# Patient Record
Sex: FEMALE | Race: WHITE
[De-identification: names, ages, dates, MRNs, and addresses within clinical notes are randomized per-mention and may not be internally consistent; named-entity substitution may affect disease eponyms.]

---

## 2018-06-12 ENCOUNTER — HOSPITAL ENCOUNTER (INPATIENT)
Dept: HOSPITAL 89 - OB | Age: 28
LOS: 4 days | Discharge: HOME | End: 2018-06-16
Attending: OBSTETRICS & GYNECOLOGY | Admitting: OBSTETRICS & GYNECOLOGY
Payer: COMMERCIAL

## 2018-06-12 VITALS
BODY MASS INDEX: 26.87 KG/M2 | BODY MASS INDEX: 26.87 KG/M2 | HEIGHT: 62 IN | WEIGHT: 146 LBS | HEIGHT: 62 IN | WEIGHT: 146 LBS

## 2018-06-12 DIAGNOSIS — O30.043: Primary | ICD-10-CM

## 2018-06-12 DIAGNOSIS — Z87.891: ICD-10-CM

## 2018-06-12 DIAGNOSIS — Z3A.37: ICD-10-CM

## 2018-06-12 DIAGNOSIS — Z87.42: ICD-10-CM

## 2018-06-12 DIAGNOSIS — O61.0: ICD-10-CM

## 2018-06-12 DIAGNOSIS — E28.2: ICD-10-CM

## 2018-06-12 DIAGNOSIS — O34.83: ICD-10-CM

## 2018-06-12 DIAGNOSIS — D62: ICD-10-CM

## 2018-06-12 DIAGNOSIS — Z87.440: ICD-10-CM

## 2018-06-12 LAB — PLATELET COUNT, AUTOMATED: 191 K/UL (ref 150–450)

## 2018-06-12 PROCEDURE — 36415 COLL VENOUS BLD VENIPUNCTURE: CPT

## 2018-06-12 PROCEDURE — 85025 COMPLETE CBC W/AUTO DIFF WBC: CPT

## 2018-06-12 PROCEDURE — 86901 BLOOD TYPING SEROLOGIC RH(D): CPT

## 2018-06-12 PROCEDURE — 86850 RBC ANTIBODY SCREEN: CPT

## 2018-06-12 PROCEDURE — 86900 BLOOD TYPING SEROLOGIC ABO: CPT

## 2018-06-12 PROCEDURE — 88307 TISSUE EXAM BY PATHOLOGIST: CPT

## 2018-06-12 PROCEDURE — 86920 COMPATIBILITY TEST SPIN: CPT

## 2018-06-13 VITALS — DIASTOLIC BLOOD PRESSURE: 78 MMHG | SYSTOLIC BLOOD PRESSURE: 120 MMHG

## 2018-06-13 VITALS — DIASTOLIC BLOOD PRESSURE: 65 MMHG | SYSTOLIC BLOOD PRESSURE: 116 MMHG

## 2018-06-13 VITALS — DIASTOLIC BLOOD PRESSURE: 82 MMHG | SYSTOLIC BLOOD PRESSURE: 134 MMHG

## 2018-06-13 VITALS — SYSTOLIC BLOOD PRESSURE: 122 MMHG | DIASTOLIC BLOOD PRESSURE: 67 MMHG

## 2018-06-13 VITALS — SYSTOLIC BLOOD PRESSURE: 121 MMHG | DIASTOLIC BLOOD PRESSURE: 81 MMHG

## 2018-06-13 VITALS — SYSTOLIC BLOOD PRESSURE: 114 MMHG | DIASTOLIC BLOOD PRESSURE: 72 MMHG

## 2018-06-13 VITALS — SYSTOLIC BLOOD PRESSURE: 125 MMHG | DIASTOLIC BLOOD PRESSURE: 85 MMHG

## 2018-06-13 VITALS — SYSTOLIC BLOOD PRESSURE: 127 MMHG | DIASTOLIC BLOOD PRESSURE: 79 MMHG

## 2018-06-13 VITALS — DIASTOLIC BLOOD PRESSURE: 77 MMHG | SYSTOLIC BLOOD PRESSURE: 112 MMHG

## 2018-06-13 VITALS — DIASTOLIC BLOOD PRESSURE: 62 MMHG | SYSTOLIC BLOOD PRESSURE: 112 MMHG

## 2018-06-13 VITALS — SYSTOLIC BLOOD PRESSURE: 112 MMHG | DIASTOLIC BLOOD PRESSURE: 84 MMHG

## 2018-06-13 VITALS — SYSTOLIC BLOOD PRESSURE: 125 MMHG | DIASTOLIC BLOOD PRESSURE: 75 MMHG

## 2018-06-13 VITALS — DIASTOLIC BLOOD PRESSURE: 71 MMHG | SYSTOLIC BLOOD PRESSURE: 129 MMHG

## 2018-06-13 VITALS — DIASTOLIC BLOOD PRESSURE: 82 MMHG | SYSTOLIC BLOOD PRESSURE: 128 MMHG

## 2018-06-13 LAB — PLATELET COUNT, AUTOMATED: 143 K/UL (ref 150–450)

## 2018-06-13 PROCEDURE — 3E033VJ INTRODUCTION OF OTHER HORMONE INTO PERIPHERAL VEIN, PERCUTANEOUS APPROACH: ICD-10-PCS | Performed by: OBSTETRICS & GYNECOLOGY

## 2018-06-13 PROCEDURE — 10907ZC DRAINAGE OF AMNIOTIC FLUID, THERAPEUTIC FROM PRODUCTS OF CONCEPTION, VIA NATURAL OR ARTIFICIAL OPENING: ICD-10-PCS | Performed by: OBSTETRICS & GYNECOLOGY

## 2018-06-13 RX ADMIN — FAMOTIDINE SCH MG: 20 TABLET, FILM COATED ORAL at 23:00

## 2018-06-13 RX ADMIN — METHYLERGONOVINE MALEATE SCH MG: 0.2 TABLET ORAL at 23:00

## 2018-06-13 RX ADMIN — SODIUM CHLORIDE, SODIUM LACTATE, POTASSIUM CHLORIDE, AND CALCIUM CHLORIDE SCH MLS/HR: 600; 310; 30; 20 INJECTION, SOLUTION INTRAVENOUS at 14:48

## 2018-06-13 RX ADMIN — ROPIVACAINE HYDROCHLORIDE PRN MCG: 2 INJECTION, SOLUTION EPIDURAL; INFILTRATION at 11:04

## 2018-06-13 RX ADMIN — ROPIVACAINE HYDROCHLORIDE PRN MCG: 2 INJECTION, SOLUTION EPIDURAL; INFILTRATION at 09:25

## 2018-06-13 RX ADMIN — SODIUM CHLORIDE, SODIUM LACTATE, POTASSIUM CHLORIDE, AND CALCIUM CHLORIDE SCH MLS/HR: 600; 310; 30; 20 INJECTION, SOLUTION INTRAVENOUS at 00:13

## 2018-06-13 RX ADMIN — DOCUSATE CALCIUM SCH MG: 240 CAPSULE, LIQUID FILLED ORAL at 21:00

## 2018-06-13 RX ADMIN — ROPIVACAINE HYDROCHLORIDE PRN MCG: 2 INJECTION, SOLUTION EPIDURAL; INFILTRATION at 07:34

## 2018-06-13 RX ADMIN — SODIUM CHLORIDE, SODIUM LACTATE, POTASSIUM CHLORIDE, AND CALCIUM CHLORIDE SCH MLS/HR: 600; 310; 30; 20 INJECTION, SOLUTION INTRAVENOUS at 12:02

## 2018-06-13 NOTE — HISTORY & PHYSICAL
History of Present Illness


Age of Patient:  27


:  1


Para or TPAL:  0


EDC per LMP:  2018


Estimated Gestational Age:  37.3


Chief Complaint


Induction of labor


History of Present Illness


26yo  at 37.3 weeks with Di/Di twin pregnancy from IVF day 5 embryo 

transfer on 10/13/18.  Pregnancy has been uncomplicated throughout.  Growth and 

been concordant and no  labor signs or symptoms.  Both babies are female 

and vtx/vtx.  She has been offered  as an option but desires vaginal 

delivery if possible.  Planning on a double set up delivery.  Medical history 

is benign.  Tori has PCOS which complicated naturally conceiving even 

following ovulation induction which precipitated the IVF.  Otherwise she is 

healthy.  Past Medical, Surgical, Family and Obstetric Histories reviewed. 

Please see [facility] prenatal chart.





History


Patient's Blood Type:  A Positive


Rubella Status:  Immune


Group B Strep Screen:  Negative


Past Medical History:


 - T&A


 - wisdom teeth


 - Ruptured achilles tendon


PCOS, abnormal pap


Allergies:  


Coded Allergies:  


     No Known Drug Allergies (Unverified , 18)





Med Rec


Home Meds


Reported Medications


Prenatal Vit37/Iron/Folic Acid (PRENATA CHEWABLE TABLET) 1 Each Tab.chew, 1 

EACH PO, TAB.CHEW


   18





Review of Systems


All Systems Reviewed/Normal:  Yes, Except as Noted





Exam


General Exam


Vital Signs





Vital Signs








  Date Time  Temp Pulse Resp B/P (MAP) Pulse Ox O2 Delivery O2 Flow Rate FiO2


 


18 02:44 99.2 63 16 122/67 (85) 94 Room Air  








General Apperance:  Alert/Awake/No Acute Distress


Neuro:  No Gross deficits


Respiratory:  No Respiratory Distress


Abdomen:  Soft, Non-Tender, Non-Distended, Gravid - Non-Tender


Integumentary:  Skin Intact without Lesions or Rash


Psychological:  Alert & Oriented X3, Appropriate Mood & Affect





Pregnancy


Vaginal Discharge/Fluid?:  Clear Fluid (AROM)


Cervical Dialation:  4


Cervical Effacement (%):  100


Cervical Consistency:  Soft


Cervical Position:  Anterior


Fetal Presentation:  Vertex (presenting twin)





Fetus


Fetal Heart Tone Variabilty:  Moderate


FHT Accelerations:  15X15


FHT Category:  I





Medical Decision Making


Data Points


Result Diagram:  


18








Imaging


Ultrasound/Imaging


Bedside u/s:  vtx/vtx





VTE Prophylasis: Adult


Deep Vein Thrombosis/Pulmonary:  No


Pharmacological Contraindicati:  Pt at Low Risk for VTE


Mechanical Contraindications:  Pt at Low Risk for VTE





Assessment and Plan


Problems:  


(1) Dichorionic diamniotic twin pregnancy in third trimester


Assessment & Plan:  Planning double set up delivery.  U/S at bedside was vtx/

vtx.  Cervix was 3cm on admission and we had planned Cervidil but changed to 

low dose Pitocin through the night.  AROM just now done with clear fluid.  FHTs 

category 1.  Currently undecided about epidural but leaning toward having one.  

Expecting  but keeping a close watch.














RUTH DAS MD 2018 09:56

## 2018-06-13 NOTE — PROCEDURE NOTE
Anesthetic Placement Note


Anesthesia Plan:  LEB


Permit for Anesthesia Signed:  Yes


Anesthesia Technique:  Patient Sitting


Anesthesia Prep:  Chlorhexidine


Interspace:  L 4-5


Local Anesthetic:  1% Lidocaine


Amount Local - cc's:  3


Anesthesia Needle:  17g Toshahram/Dadaff


Anesthesia Attempts:  1 (no needle redirections)


Loss of Resistance:  Normal Saline


Depth of OWEN (cm):  4


Epidural Needle Placement:  No CSF, No Blood, No Parasthesia


Cerebral Spinal Fluid:  No


Catheter Insertion (cm):  5 (9 cm at skin)


Catheter Type:  Shine - Spring Wound


Epidural Dressing:  Tegaderm, Tape, Adhesive Spray


Anesthesia Tray:  Lot Number [id number]), Expiration Date (07/31/2019), 

Reference Number ([id number])





Anesthesia Medications:


Epidural Test Dose:  1.5 Lido/Epi (1:200,000), Dose - mL (5mL in incremental 

doses), Time (1256), Negative


Epidural Loading Dose:  0.2% Ropivicaine, With Fentanyl 2mcg/ml, Dose - ml (5), 

Time (1307)


Epidural Infusion:  0.2% Ropivicaine, With Fentanyl 2mcg/ml, Start Time: (1307)


Epidural Pump Setting:  Bolus Dose - mL (4), Lockout - Minutes (15), 

Maintenance Rate - mL/hr (5), Maximum per Hour - mL (17)


Complications:  None


Comment:


100 mcg Fentanyl bolus via epidural catheter at 1300











KRYSTAL CHAPARRO CRNA Jun 13, 2018 13:25

## 2018-06-13 NOTE — LABOR PROGRESS NOTE
Labor Subjective


Progress Notes


Subjective


Doing well.  Labor pain recently demanded epidural placement and now is 

comfortable.  A few variables noted on baby B.  Clear fluid still and ample 

amounts.  Was 5 cm on last exam at 1130.


Feeling Fetal Movement?:  Yes


Vaginal Discharge/Fluid:  Clear Fluid


Labor Pain:  Mild





Labor Objective


Pregnancy


Vital Signs





Vital Signs








  Date Time  Temp Pulse Resp B/P (MAP) Pulse Ox O2 Delivery O2 Flow Rate FiO2


 


6/13/18 02:44 99.2 63 16 122/67 (85) 94 Room Air  








Vaginal Discharge/Fluid?:  Clear Fluid


Cervical Dialation:  5


Cervical Effacement (%):  100


Cervical Consistency:  Soft


Cervical Position:  Anterior


Fetal Station:  -2


Fetal Presentation:  Vertex





Fetus


Fetal Heart Tone Variabilty:  Moderate


FHT Accelerations:  15X15


FHT Decelerations:  Variable (occasional on baby B)


FHT Category:  I





General Exam


Abdomen:  Soft, Non-Tender, Non-Distended, Gravid - Non-Tender


Psychological:  Alert & Oriented X3, Appropriate Mood & Affect





Other


Result Diagram:  


6/12/18 1953








Assessment and Plan


Problems:  


(1) Dichorionic diamniotic twin pregnancy in third trimester


Assessment & Plan:  Pitocin currently at 16 mu/min.  Will advance to sustain 

contraction strength post epidural.  Await progress at next exam by 1600.  If 

not, considering IUPC.














RUTH DAS MD Jun 13, 2018 14:30

## 2018-06-13 NOTE — ANESTHESIA PROGRESS NOTE
Progress/Maintenance


Anesthesia Note Date:  Jun 13, 2018


Anesthesia Note Time:  18:00


Pain Intensity:  4


Pump:  Off


Motor Level:  Bending Knees-Bilateral


Position:  Right, Tilt





Assessment and Plan


Anesthesia Plan:  LEB


Assessment


C/S called by Dr. Ramirez.  Plan to use epidural with GA as backup.  Epidural 

infusion off and 2% Lidocaine + 1:200k epi bolused in 5 mL increments for a 

total of 15 mL.  T6 level bilaterally before incision.


Anesthesia Stop Day:  Jun 13, 2018


Anesthesia Stop Time:  18:00


Epidural Catheter Removal:  Removed Catheter Intact, Yes, Removed by: (Krystal Clayton CRNA)


Removal Date:  Jun 13, 2018


Removal Time:  19:28











KRYSTAL CLAYTON CRNA Jun 13, 2018 20:05

## 2018-06-13 NOTE — POST OPERATIVE NOTE
Operative Note - OB/GYN


Operative Day


Date:  Jun 13, 2018


Time:  19:31





Physicians


Surgeon:  


Ashley


Assistant:  


Kristian


Anesthesia:  


Epidural





Diagnosis


Pre-Op Diagnosis:  


Twin gestation Di/Di


Failure to progress in labor


Post-Op Diagnosis:  


Same


Uterine atony





Procedure


Findings:  


vtx/vtx, compacted, uterin atony, normal uterus, ovaries and tubes


Procedure(s):  


Primary LTCS


Specimen Removed:(Maybe N/A):  


placenta


Complications:  


hemorrhage


#[id number]





Fluids


Fluids:  


2000 ml


Estimated Blood Loss:  


2000 ml





Dictated


Date OP Note Dictated:  Jun 13, 2018


Time OP Note Dictated:  19:32


Copies to:   RUTH DAS MD, TRAVIS MD Jun 13, 2018 19:40

## 2018-06-13 NOTE — LABOR PROGRESS NOTE
Labor Subjective


Progress Notes


Subjective


Cervix not much different or descent of presenting twin.  Contractions at good 

frequency and palpating moderate to strong.  Pitocin at 20mu/min.  Discussed 

options including IUPC placement and adjusting Pitocin and more time.  Pt is 

now frustrated and tired.   delivery also offered and she elects to 

proceed with this.  Risks/benefits discussed and expected recovery.  Questions 

answered.





Labor Objective


Pregnancy


Vital Signs





Vital Signs








  Date Time  Temp Pulse Resp B/P (MAP) Pulse Ox O2 Delivery O2 Flow Rate FiO2


 


18 02:44 99.2 63 16 122/67 (85) 94 Room Air  








Cervical Dialation:  5.5


Cervical Effacement (%):  100


Cervical Consistency:  Soft


Cervical Position:  Anterior


Fetal Station:  -2


Fetal Presentation:  Vertex





Fetus


Fetal Heart Tone Variabilty:  Moderate


FHT Accelerations:  15X15


FHT Category:  I





Other


Result Diagram:  


18








Assessment and Plan


Problems:  


(1) Dichorionic diamniotic twin pregnancy in third trimester


Assessment & Plan:  proceed with abdominal delivery as discussed and consented 

for.














RUTH DAS MD 2018 18:03

## 2018-06-13 NOTE — ANESTHESIA OB PRE-ANES EVAL
History of Present Illness


Anesthesia Start Date:  2018


Anesthesia Start Time:  12:37


OB Anesthesia Diagnosis:  induction - medical (Twins 37 4/7 weeks)


EDC:  2018


:  1


Para:  0


Vital Signs:





Vital Signs








  Date Time  Temp Pulse Resp B/P (MAP) Pulse Ox O2 Delivery O2 Flow Rate FiO2


 


18 02:44 99.2 63 16 122/67 (85) 94 Room Air  








Pain Ratin


Result Diagram:  


18





Height (Inches):  62.00


Weight (Pounds):  146


BMI Calculated:  26.70





Past Medical History


Medical History:  no pertinent history (former smoker)


Surgical History:  tonsillectomy, other (ACL repair)


Previous Anesthesia:  general


Attended Childbirth Classes?:  No


Hx Anesthesia Reactions:  No


Hx Family Anesthesia Reaction:  No


Current Medications:  pitocin, pain medication


Home Meds


Reported Medications


Prenatal Vit37/Iron/Folic Acid (PRENATA CHEWABLE TABLET) 1 Each Tab.chew, 1 

EACH PO, TAB.CHEW


   18


Allergies:  


Coded Allergies:  


     No Known Drug Allergies (Unverified , 18)





Anesthesia OB ROS


Neurological:  No migraines/headaches, No seizures, No neuropathy, No other


ENT:  Denies Tooth caps, Denies Loose teeth, Denies Chipped teeth, Denies 

Dentures, Denies Bridges, Denies Retainers, Denies Veneers, Denies Implants, 

Denies Tongue ring, Denies Other


Airway Class:  ll


Cardiovascular ROS:  No edema, No arrhythmia, No other


GI ROS:  clear liquids


 ROS:  No Herpes, No STD(s), No Liver Disease, No Renal Disease, No Other


Endocrine ROS:  No diabetes, No gestational diabetes, No thyroid disorder, No 

other


Musculoskeletal ROS:  No low back pain, No low back injury, No scoliosis, No 

other


ASA Classification:  1





Assessment and Plan


Anesthesia Plan:  LEB (Consented for GA as backup in event of emergent c/s and 

insufficient time to dose epidural.  )











KRYSTAL CHAPARRO CRNA 2018 13:21

## 2018-06-14 VITALS — DIASTOLIC BLOOD PRESSURE: 74 MMHG | SYSTOLIC BLOOD PRESSURE: 99 MMHG

## 2018-06-14 VITALS — SYSTOLIC BLOOD PRESSURE: 115 MMHG | DIASTOLIC BLOOD PRESSURE: 79 MMHG

## 2018-06-14 VITALS — DIASTOLIC BLOOD PRESSURE: 67 MMHG | SYSTOLIC BLOOD PRESSURE: 103 MMHG

## 2018-06-14 VITALS — DIASTOLIC BLOOD PRESSURE: 70 MMHG | SYSTOLIC BLOOD PRESSURE: 104 MMHG

## 2018-06-14 VITALS — DIASTOLIC BLOOD PRESSURE: 59 MMHG | SYSTOLIC BLOOD PRESSURE: 103 MMHG

## 2018-06-14 VITALS — DIASTOLIC BLOOD PRESSURE: 63 MMHG | SYSTOLIC BLOOD PRESSURE: 113 MMHG

## 2018-06-14 VITALS — SYSTOLIC BLOOD PRESSURE: 97 MMHG | DIASTOLIC BLOOD PRESSURE: 72 MMHG

## 2018-06-14 VITALS — SYSTOLIC BLOOD PRESSURE: 100 MMHG | DIASTOLIC BLOOD PRESSURE: 61 MMHG

## 2018-06-14 VITALS — DIASTOLIC BLOOD PRESSURE: 71 MMHG | SYSTOLIC BLOOD PRESSURE: 100 MMHG

## 2018-06-14 VITALS — DIASTOLIC BLOOD PRESSURE: 63 MMHG | SYSTOLIC BLOOD PRESSURE: 107 MMHG

## 2018-06-14 VITALS — SYSTOLIC BLOOD PRESSURE: 99 MMHG | DIASTOLIC BLOOD PRESSURE: 61 MMHG

## 2018-06-14 VITALS — DIASTOLIC BLOOD PRESSURE: 67 MMHG | SYSTOLIC BLOOD PRESSURE: 100 MMHG

## 2018-06-14 LAB
PLATELET COUNT, AUTOMATED: 127 K/UL (ref 150–450)
PLATELET COUNT, AUTOMATED: 128 K/UL (ref 150–450)

## 2018-06-14 PROCEDURE — 30233N1 TRANSFUSION OF NONAUTOLOGOUS RED BLOOD CELLS INTO PERIPHERAL VEIN, PERCUTANEOUS APPROACH: ICD-10-PCS | Performed by: OBSTETRICS & GYNECOLOGY

## 2018-06-14 RX ADMIN — SIMETHICONE CHEW TAB 80 MG PRN MG: 80 TABLET ORAL at 21:18

## 2018-06-14 RX ADMIN — IBUPROFEN SCH MG: 800 TABLET ORAL at 20:19

## 2018-06-14 RX ADMIN — METHYLERGONOVINE MALEATE SCH MG: 0.2 TABLET ORAL at 16:22

## 2018-06-14 RX ADMIN — DOCUSATE CALCIUM SCH MG: 240 CAPSULE, LIQUID FILLED ORAL at 20:18

## 2018-06-14 RX ADMIN — KETOROLAC TROMETHAMINE SCH MG: 30 INJECTION, SOLUTION INTRAMUSCULAR; INTRAVENOUS at 07:17

## 2018-06-14 RX ADMIN — FAMOTIDINE SCH MG: 20 TABLET, FILM COATED ORAL at 20:19

## 2018-06-14 RX ADMIN — SIMETHICONE CHEW TAB 80 MG PRN MG: 80 TABLET ORAL at 09:05

## 2018-06-14 RX ADMIN — METHYLERGONOVINE MALEATE SCH MG: 0.2 TABLET ORAL at 11:14

## 2018-06-14 RX ADMIN — FAMOTIDINE SCH MG: 20 TABLET, FILM COATED ORAL at 09:04

## 2018-06-14 RX ADMIN — KETOROLAC TROMETHAMINE SCH MG: 30 INJECTION, SOLUTION INTRAMUSCULAR; INTRAVENOUS at 01:17

## 2018-06-14 RX ADMIN — DOCUSATE CALCIUM SCH MG: 240 CAPSULE, LIQUID FILLED ORAL at 09:04

## 2018-06-14 RX ADMIN — METHYLERGONOVINE MALEATE SCH MG: 0.2 TABLET ORAL at 04:49

## 2018-06-14 NOTE — ANESTHESIA POST EVAL NOTE
Anesthesia Post Eval Note





Vital Signs








  Date Time  Temp Pulse Resp B/P (MAP) Pulse Ox O2 Delivery O2 Flow Rate FiO2


 


6/14/18 09:46  102      


 


6/14/18 09:46   18 103/59    


 


6/14/18 09:46 98.4       


 


6/14/18 09:37     93 Room Air  


 


6/14/18 06:00       1.0 








Pt able to participate in Eval:  Yes


Cardiovascular Status:  Satisfactory


Respiratory Status:  Satisfactory


Pain Managment:  Satisfactory


PO Nausea/Vomiting:  Satisfactory


Temperature Management:  Satisfactory


Mental Status:  Satisfactory, Alert, Oriented X3


Post-Op Hydration Status:  Satisfactory, Tolerating PO Well


Anesthesia Type:  LEB


Anesthesia Tolerance:


Doing well this morning.  H&H trended down throughout the night and is now 

receiving 2 units PRBCs per surgeon.  No c/o nausea now.  Pain well controlled.

  Only complaint is pruritus which has been unresponsive to nubain and 

diphenhydramine.  No questions or concerns from patient.  States she is happy 

with care received.











KRYSTAL CHAPARRO CRNA Jun 14, 2018 10:34

## 2018-06-14 NOTE — OPERATIVE REPORT 1
EVENT DATE: 2018

SURGEON: Twin Ramirez MD

ANESTHESIA: Epidural, Cheko Clayton CRNA

ASSISTANT: Dedrick Townsend MD



PREOPERATIVE DIAGNOSES

1.  Twin gestation, diamniotic, dichorionic.

2.  Vertex, vertex presentation.

3.  Failure to progress in labor/failed induction.  



POSTOPERATIVE DIAGNOSES

1.  Twin gestation, diamniotic, dichorionic.

2.  Vertex, vertex presentation.

3.  Failure to progress in labor/failed induction.  

4.  Uterine atony with postpartum hemorrhage.



PROCEDURE PERFORMED 

Primary low transverse  section via Pfannenstiel skin incision.  



ESTIMATED BLOOD LOSS 

2000 mL.



FLUIDS

2000 mL IV crystalloid.



MEDICATIONS ADMINISTERED

40 units of Pitocin infused IV, 0.2 mg Methergine administered IM and 250 mcg 
carboprost administered IM during the case.  800 mcg Cytotec was placed 
rectally upon completion of the case.  



PROCEDURE IN DETAIL

The patient was brought to the operating room after informed consent was 
obtained with an epidural in place and placed in the dorsal supine position 
with a leftward tilt.  Epidural was brought to surgical levels, and she was 
prepped and draped in the usual sterile fashion.  Using a knife, a Pfannenstiel 
skin incision was made, carried through to the underlying rectus fascia.  This 
was nicked in the midline, extended laterally.  The rectus fascia was dissected 
off underlying rectus muscles using sharp dissection, and then  in the 
midline.  Peritoneum was entered sharply and extended superiorly and 
inferiorly.  Bladder blade was inserted.  The vesicouterine peritoneum was 
elevated and entered sharply and extended laterally.  A bladder flap was 
created digitally.  Using a scalpel, low transverse incision on the uterus was 
made and carried through to the intra-amniotic space.  This was put on lateral 
stretch.  A hand was inserted.  Baby B's head was immediately encountered, and 
I tracked the hand along the uterus lower segment to the cervix, and Baby A's 
head was encountered.  Both were compacted into the pelvis tightly.  Baby A's 
head was elevated to the incision.  Fundal pressure was applied.  The infant's 
head delivered atraumatically.  Mouth and nose were bulb suctioned.  Followed 
by further fundal pressure and manipulation resulted in the delivery of the 
remainder of the infant.  Mouth and nose were again bulb suctioned.  Baby B was 
brought quickly to the presenting location, and fundal pressure was applied.  
The infant's head delivered, followed by manipulation, resulted in delivery of 
the remainder of that infant. Mouth and nose were bulb suctioned.  The cords 
were clamped, cut, and the infants were passed to the waiting resuscitation 
team.  Cord sample was obtained.  Placentas were removed manually and were two 
separate placentas.  Uterus was exteriorized and cleared of clots and debris.  
Thrasher clamps were placed for hemostasis.  Immediately noted was 
significant uterine atony with a very floppy uterus.  We had to roll the uterus 
in order to compress it, all the while Pitocin began infusing initially at 20 
units in the bag, and 0.2 mg of Methergine was called for.  The first layer of 
the uterus was repaired using a #1 Monocryl in a running locking stitch.  Upon 
completion here, another assessment of the uterus resulted in minor improvement 
in uterine tone.  Therefore another 20 units of Pitocin was placed in the bag 
followed by request for 250 mcg of Carboprost.  The second layer imbrication of 
the uterine incision was performed.  Upon completion, there was a moderate 
amount of tone resultant back in the uterus.  Further uterine massage and 
expression of clots were performed in order to assist with the uterine atony.  
The 800 mcg of Cytotec was called for to be ready for placement upon completion 
of the case.   The ultimate uterine tone had improved enough to return the 
uterus to the abdomen after the posterior cul-de-sac was irrigated and swept 
clear of clots and debris.  Bilateral pelvic gutters were swept clear of clots 
and debris  The incision was again inspected in situ, found to be hemostatic.  
Therefore the parietal peritoneum was repaired using a 3-0 Vicryl in a running 
non-locking stitch.  Rectus muscles were reapproximated in the midline with the 
same stitch.  A few capillary bleeders were cauterized on the muscle bellies.  
The rectus fascia was repaired using an #0-Vicryl in a running non-locking 
stitch.  Subcuticular dead space was irrigated and swept clear of clots and 
debris.  Capillary bleeders were cauterized.  The dead  space was closed with a 
3-0 Vicryl in a running non-locking stitch, and skin was repaired using a 4-0 
Monocryl simple subdermal.  The skin incision was covered with Dermabond skin 
adhesive.  Sponge, lap, needle and instrument counts were all correct x three.  
Upon completion, the uterus was manually massaged.  Expression of more clot was 
found at this time, but then once the clot had been expressed, a scant amount 
of bleeding was noted.  Altogether, approximately 2000 mL of blood loss was 
estimated.  The Cytotec 800 mcg was administered rectally.  She was cleaned up 
and returned to Recovery in stable condition.  

[facility]

## 2018-06-14 NOTE — OB/GYN PROGRESS NOTE
OB Subjective


Progress Notes


Subjective


Feeling "foggy" today.  Oxygen sats have been in 80s while asleep.  Little 

incision pain but pain in upper chest/shoulders.  UO improving today.


GI:  NEG Nausea


Pain:  Mild





OB Objective


Physical Exam





Vital Signs








  Date Time  Temp Pulse Resp B/P (MAP) Pulse Ox O2 Delivery O2 Flow Rate FiO2


 


6/14/18 06:00   16  88  1.0 


 


6/14/18 03:00  103  113/63 (80)    


 


6/13/18 20:47 99.7     Room Air  








General Appearance:  Alert/Awake/No Acute Distress


Neurological:  No Gross deficits


Cardiovascular:  Normal Rhythm & Peripheral Pulses, Regular Rate and Rhythm


Respiratory:  No Respiratory Distress, Clear to Auscultation


Abdomen:  Soft, Non-Tender, Non-Distended, Fundus Firm, Tender


Incision:  Clean, Dry, Intact, Dermabond


Extremities:  Warm, Edema (2+)


Integumentary:  Skin Intact without Lesions or Rash


Psychological:  Alert & Oriented X3, Appropriate Mood & Affect


Result Diagram:  


6/14/18 0632








Assessment and Plan


OB/GYN Plan:  Routine Post-Op Care


Problems:  


(1) Dichorionic diamniotic twin pregnancy in third trimester


(2) Anemia due to acute blood loss


Assessment & Plan:  Discussed and offered transfusion.  Risks/benefits reviewed 

and she desires transfusion.  Will give 2 units.














RUTH DAS MD Jun 14, 2018 08:44

## 2018-06-15 VITALS — SYSTOLIC BLOOD PRESSURE: 120 MMHG | DIASTOLIC BLOOD PRESSURE: 96 MMHG

## 2018-06-15 VITALS — SYSTOLIC BLOOD PRESSURE: 103 MMHG | DIASTOLIC BLOOD PRESSURE: 65 MMHG

## 2018-06-15 VITALS — DIASTOLIC BLOOD PRESSURE: 81 MMHG | SYSTOLIC BLOOD PRESSURE: 127 MMHG

## 2018-06-15 RX ADMIN — MAGNESIUM HYDROXIDE PRN ML: 400 SUSPENSION ORAL at 02:41

## 2018-06-15 RX ADMIN — IBUPROFEN SCH MG: 800 TABLET ORAL at 21:01

## 2018-06-15 RX ADMIN — BISACODYL PRN MG: 10 SUPPOSITORY RECTAL at 21:01

## 2018-06-15 RX ADMIN — SIMETHICONE CHEW TAB 80 MG PRN MG: 80 TABLET ORAL at 08:25

## 2018-06-15 RX ADMIN — DOCUSATE CALCIUM SCH MG: 240 CAPSULE, LIQUID FILLED ORAL at 21:01

## 2018-06-15 RX ADMIN — IBUPROFEN SCH MG: 800 TABLET ORAL at 05:31

## 2018-06-15 RX ADMIN — FAMOTIDINE SCH MG: 20 TABLET, FILM COATED ORAL at 08:24

## 2018-06-15 RX ADMIN — MAGNESIUM HYDROXIDE PRN ML: 400 SUSPENSION ORAL at 11:12

## 2018-06-15 RX ADMIN — IBUPROFEN SCH MG: 800 TABLET ORAL at 04:00

## 2018-06-15 RX ADMIN — SIMETHICONE CHEW TAB 80 MG PRN MG: 80 TABLET ORAL at 21:01

## 2018-06-15 RX ADMIN — DOCUSATE CALCIUM SCH MG: 240 CAPSULE, LIQUID FILLED ORAL at 08:24

## 2018-06-15 RX ADMIN — BISACODYL PRN MG: 10 SUPPOSITORY RECTAL at 08:25

## 2018-06-15 RX ADMIN — SIMETHICONE CHEW TAB 80 MG PRN MG: 80 TABLET ORAL at 02:48

## 2018-06-15 RX ADMIN — FAMOTIDINE SCH MG: 20 TABLET, FILM COATED ORAL at 21:01

## 2018-06-15 NOTE — OB/GYN PROGRESS NOTE
OB Subjective


Progress Notes


Subjective


Pain controlled, Tolerating diet and activity.  Baby breastfeeding.  Normal 

lochia.


GI:  POS Flatus, NEG Nausea, NEG Vomiting


:  Voiding Well


Pain:  Mild





OB Objective


Physical Exam





Vital Signs








  Date Time  Temp Pulse Resp B/P (MAP) Pulse Ox O2 Delivery O2 Flow Rate FiO2


 


6/15/18 04:30 98.1  16 103/65 (78)  Room Air  


 


18 21:00       1.0 


 


18 17:15  86   90   








General Appearance:  Alert/Awake/No Acute Distress


Neurological:  No Gross deficits


Cardiovascular:  Normal Rhythm & Peripheral Pulses, Regular Rate and Rhythm


Respiratory:  No Respiratory Distress, Clear to Auscultation


Abdomen:  Soft, Non-Tender, Non-Distended, Fundus Firm, Tender


Incision:  Clean, Dry, Intact, Dermabond


Extremities:  Warm, Edema (2+)


Integumentary:  Skin Intact without Lesions or Rash


Psychological:  Alert & Oriented X3, Appropriate Mood & Affect


Result Diagram:  


18 1813








Assessment and Plan


Problems:  


(1) Dichorionic diamniotic twin pregnancy in third trimester


(2) Anemia due to acute blood loss


Assessment & Plan:  h/h improved tolerating well





(3) Postpartum care following  delivery


Assessment & Plan:  Pain controlled, Tolerating diet and activity.  Baby 

breastfeeding.  Normal lochia.














TORSTEN ALEXIS MD Holden 15, 2018 07:18

## 2018-06-15 NOTE — OB/GYN DISCHARGE SUMMARY
Discharge Summary


Reason for Hosp/Final Diag:  


(1) Dichorionic diamniotic twin pregnancy in third trimester


(2) Anemia due to acute blood loss


(3) Postpartum care following  delivery


Hospital Course & Plan:  TWIN C SECTION, UTERINE ATONY, TRANSFUSION, ON DAY 2, 

Pain controlled, Tolerating diet and activity.  Baby breastfeeding.  Normal 

lochia.





Lates Vital Signs





Vital Signs








  Date Time  Temp Pulse Resp B/P (MAP) Pulse Ox O2 Delivery O2 Flow Rate FiO2


 


6/15/18 04:30 98.1  16 103/65 (78)  Room Air  


 


18 21:00       1.0 


 


18 17:15  86   90   








Weight (Pounds):  146


Result Diagram:  


18 1813





Condition:  Improved


Discharge:  Home, Self Care


Home Meds


Active Scripts


Oxycodone Hcl/Acetaminophen (OXYCODONE-ACETAMINOPHEN 5-325) 1 Each Tablet, 1-2 

EACH PO Q4H Y for PAIN, #30 TAB 0 Refills


   TAKE 1-2 TABLET AS NEEDED FOR PAIN - NO CLOSER THAN EVERY 4 HOURS.


   Prov:OTRSTEN ALEXIS MD         6/15/18


Reported Medications


Prenatal Vit37/Iron/Folic Acid (PRENATA CHEWABLE TABLET) 1 Each Tab.chew, 1 

EACH PO, TAB.CHEW


   18


Follow up with:  Dr. Ramirez [phone]


Follow up in:  6 wks PP or PO, 2 wks PO


Discharge Diet:  As Tolerates


Discharge Activity:  Pelvic Rest


Copies to:   TORSTEN ALEXIS MD, JOHN MD Holden 15, 2018 07:27

## 2018-06-16 VITALS — SYSTOLIC BLOOD PRESSURE: 114 MMHG | DIASTOLIC BLOOD PRESSURE: 78 MMHG

## 2018-06-16 VITALS — DIASTOLIC BLOOD PRESSURE: 85 MMHG | SYSTOLIC BLOOD PRESSURE: 117 MMHG

## 2018-06-16 RX ADMIN — IBUPROFEN SCH MG: 800 TABLET ORAL at 04:28

## 2018-06-16 NOTE — OB/GYN PROGRESS NOTE
OB Subjective


Progress Notes


Subjective


Pain controlled, Tolerating diet and activity.  Baby breastfeeding.  Normal 

lochia.


GI:  POS Flatus, NEG Nausea, NEG Vomiting


:  Voiding Well


Pain:  Mild





OB Objective


Physical Exam





Vital Signs








  Date Time  Temp Pulse Resp B/P (MAP) Pulse Ox O2 Delivery O2 Flow Rate FiO2


 


18 00:30 98.0  16 117/85 (96)    


 


6/15/18 12:00  61      


 


6/15/18 04:30      Room Air  


 


18 21:00       1.0 


 


18 17:15     90   








General Appearance:  Alert/Awake/No Acute Distress


Neurological:  No Gross deficits


Cardiovascular:  Normal Rhythm & Peripheral Pulses, Regular Rate and Rhythm


Respiratory:  No Respiratory Distress, Clear to Auscultation


Abdomen:  Soft, Non-Tender, Non-Distended, Bowel Sounds Present, Fundus Firm, 

Tender


Incision:  Clean, Dry, Intact, Dermabond


Extremities:  Warm, Edema (2+)


Integumentary:  Skin Intact without Lesions or Rash


Psychological:  Alert & Oriented X3, Appropriate Mood & Affect


Result Diagram:  


18 1813








Assessment and Plan


Problems:  


(1) Dichorionic diamniotic twin pregnancy in third trimester


(2) Anemia due to acute blood loss


(3) Postpartum care following  delivery


Assessment & Plan:  Pain controlled, Tolerating diet and activity.  Baby 

breastfeeding.  Normal lochia.














TORSTEN ALEXIS MD 2018 07:44